# Patient Record
Sex: MALE | Race: BLACK OR AFRICAN AMERICAN | Employment: UNEMPLOYED | URBAN - METROPOLITAN AREA
[De-identification: names, ages, dates, MRNs, and addresses within clinical notes are randomized per-mention and may not be internally consistent; named-entity substitution may affect disease eponyms.]

---

## 2021-03-22 ENCOUNTER — HOSPITAL ENCOUNTER (EMERGENCY)
Age: 41
Discharge: ANOTHER HOSPITAL | End: 2021-03-23
Attending: EMERGENCY MEDICINE
Payer: MEDICAID

## 2021-03-22 DIAGNOSIS — H61.22 IMPACTED CERUMEN OF LEFT EAR: ICD-10-CM

## 2021-03-22 DIAGNOSIS — B02.30 OPHTHALMIC HERPES ZOSTER: Primary | ICD-10-CM

## 2021-03-22 PROCEDURE — 99285 EMERGENCY DEPT VISIT HI MDM: CPT

## 2021-03-22 PROCEDURE — 6370000000 HC RX 637 (ALT 250 FOR IP): Performed by: PHYSICIAN ASSISTANT

## 2021-03-22 PROCEDURE — 96365 THER/PROPH/DIAG IV INF INIT: CPT

## 2021-03-22 PROCEDURE — 69209 REMOVE IMPACTED EAR WAX UNI: CPT

## 2021-03-22 RX ORDER — TETRACAINE HYDROCHLORIDE 5 MG/ML
1 SOLUTION OPHTHALMIC ONCE
Status: COMPLETED | OUTPATIENT
Start: 2021-03-22 | End: 2021-03-22

## 2021-03-22 RX ADMIN — TETRACAINE HYDROCHLORIDE 1 DROP: 5 SOLUTION OPHTHALMIC at 22:54

## 2021-03-22 RX ADMIN — FLUORESCEIN SODIUM 1 MG: 1 STRIP OPHTHALMIC at 22:54

## 2021-03-22 ASSESSMENT — PAIN DESCRIPTION - ORIENTATION: ORIENTATION: LEFT

## 2021-03-22 ASSESSMENT — PAIN DESCRIPTION - ONSET: ONSET: ON-GOING

## 2021-03-22 ASSESSMENT — PAIN - FUNCTIONAL ASSESSMENT: PAIN_FUNCTIONAL_ASSESSMENT: PREVENTS OR INTERFERES SOME ACTIVE ACTIVITIES AND ADLS

## 2021-03-22 ASSESSMENT — PAIN SCALES - GENERAL: PAINLEVEL_OUTOF10: 10

## 2021-03-22 ASSESSMENT — PAIN DESCRIPTION - PAIN TYPE: TYPE: ACUTE PAIN

## 2021-03-22 ASSESSMENT — PAIN DESCRIPTION - FREQUENCY: FREQUENCY: CONTINUOUS

## 2021-03-23 VITALS
OXYGEN SATURATION: 98 % | HEART RATE: 80 BPM | SYSTOLIC BLOOD PRESSURE: 118 MMHG | RESPIRATION RATE: 16 BRPM | BODY MASS INDEX: 21.98 KG/M2 | TEMPERATURE: 97.5 F | HEIGHT: 68 IN | WEIGHT: 145 LBS | DIASTOLIC BLOOD PRESSURE: 78 MMHG

## 2021-03-23 PROCEDURE — 96365 THER/PROPH/DIAG IV INF INIT: CPT

## 2021-03-23 PROCEDURE — 2580000003 HC RX 258: Performed by: PHYSICIAN ASSISTANT

## 2021-03-23 PROCEDURE — 6360000002 HC RX W HCPCS: Performed by: PHYSICIAN ASSISTANT

## 2021-03-23 RX ORDER — ACYCLOVIR 400 MG/1
800 TABLET ORAL 3 TIMES DAILY
Qty: 60 TABLET | Refills: 0 | Status: SHIPPED | OUTPATIENT
Start: 2021-03-23 | End: 2021-04-02

## 2021-03-23 RX ADMIN — ACYCLOVIR SODIUM 330 MG: 50 INJECTION, SOLUTION INTRAVENOUS at 01:51

## 2021-03-23 NOTE — ED TRIAGE NOTES
Pt presents to the ED from home ambulatory with c/o a herpes infection in eyes. Pt reports he was prescribed zigan and cannot afford it.

## 2021-03-23 NOTE — ED NOTES
Visual acuity test attempted. 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  Pt was asked if he at least sees the 'E', pt replied 'No'      Oze Holiday, [place]  03/23/21 3966

## 2021-03-23 NOTE — ED PROVIDER NOTES
[facility] ENCOUNTER          PHYSICIAN ASSISTANT NOTE       Date of evaluation: 3/22/2021    Chief Complaint     Eye Problem (herpes in left eye, seen at [facility], unable to afford medications)      History of Present Illness     Nando Harris is a 41 y.o. male with a PMH of HIV who presents with complaints of left eye pain. Patient reports he was seen at [facility] on 03/19 and told he had herpes in his eye. Patient was prescribed Yevgeniy Adriana but could not afford it. Patient states his eye has not gotten any better. He reports crusting when he wakes. Endorses photophobia and blurred vision. Patient also endorses left ear pain for several  Patient denies fever, chills, rashes, chest pain, shortness of breath, nausea, vomiting, abdominal pain, diarrhea, or constipation. With the exception of the above, there are no aggravating or alleviating factors. Review of Systems     ROS: Pertinent positive and negative findings as documented in the HPI, otherwise all other systems were reviewed and were negative. Past Medical, Surgical, Family, and Social History     He has no past medical history on file. He has no past surgical history on file. His family history is not on file. He reports that he has never smoked. He has never used smokeless tobacco. He reports current alcohol use. He reports current drug use. Drug: Marijuana. Medications     Previous Medications    No medications on file       Allergies     He has No Known Allergies. Physical Exam     INITIAL VITALS: BP: 118/80, Temp: 97.5 °F (36.4 °C), Pulse: 75, Resp: 16, SpO2: 98 %     General: 41 y.o. male in no apparent distress, well developed, well nourished, non-toxic appearance. HEENT: Atraumatic, normocephalic. EOMs intact. PERRLA. Mild erythema to left lower eyelid. Slit lamp: Positive dendritic lesion at the 6 o'clock position over the iris. Left EAC with cerumen impaction.  No tenderness with manipulation of the external who presents to the emergency department with complaints of eye pain. On presentation, patient is well-appearing and in no acute distress. Patient is afebrile with stable VS.    Patient noted left ear pain with decreased hearing for several weeks. Physical exam revealed impacted cerumen. Please see procedure note above for details of cerumen removal.  Patient reported immediate relief of his ear discomfort with improvement of hearing. Physical exam is consistent with ophthalmic herpes. Chart review shows a history of HIV though the patient was not aware of this. Given his possible immunosuppression, patient was given a dose of IV acyclovir and will be sent to [facility] for evaluation by ophthalmology. At this point in time, patient is stable for discharge and will present to [facility] for evaluation by opthalmology. Patient was agreeable and understanding to this plan of care. Prior to discharge, patient was ambulatory and PO tolerant. The patient was seen and evaluated by the attending physician who agreed with the assessment and plan. The patient and / or the family were informed of the results of any tests, a time was given to answer questions, a plan was proposed and they agreed with plan. Clinical Impression     1. Ophthalmic herpes zoster        Disposition     PATIENT REFERRED TO:  No follow-up provider specified.     DISCHARGE MEDICATIONS:  New Prescriptions    No medications on file       [place], [place]  03/23/21 0105

## 2021-03-23 NOTE — ED NOTES
Pt came into the ED with c/o of Herpes Viris in his eye that has a shooting pain. Pt states this is his 1st outbreak.       Willow Bowser RN  03/22/21 [id number]